# Patient Record
Sex: FEMALE | Race: WHITE | URBAN - METROPOLITAN AREA
[De-identification: names, ages, dates, MRNs, and addresses within clinical notes are randomized per-mention and may not be internally consistent; named-entity substitution may affect disease eponyms.]

---

## 2017-03-29 ENCOUNTER — OUTPATIENT (OUTPATIENT)
Dept: OUTPATIENT SERVICES | Facility: HOSPITAL | Age: 57
LOS: 1 days | Discharge: HOME | End: 2017-03-29

## 2017-06-27 DIAGNOSIS — Z12.31 ENCOUNTER FOR SCREENING MAMMOGRAM FOR MALIGNANT NEOPLASM OF BREAST: ICD-10-CM

## 2019-11-02 ENCOUNTER — OUTPATIENT (OUTPATIENT)
Dept: OUTPATIENT SERVICES | Facility: HOSPITAL | Age: 59
LOS: 1 days | Discharge: HOME | End: 2019-11-02
Payer: COMMERCIAL

## 2019-11-02 DIAGNOSIS — Z12.31 ENCOUNTER FOR SCREENING MAMMOGRAM FOR MALIGNANT NEOPLASM OF BREAST: ICD-10-CM

## 2019-11-02 DIAGNOSIS — Z85.3 PERSONAL HISTORY OF MALIGNANT NEOPLASM OF BREAST: ICD-10-CM

## 2019-11-02 PROCEDURE — 77063 BREAST TOMOSYNTHESIS BI: CPT | Mod: 26

## 2019-11-02 PROCEDURE — 77067 SCR MAMMO BI INCL CAD: CPT | Mod: 26

## 2019-11-18 PROBLEM — Z00.00 ENCOUNTER FOR PREVENTIVE HEALTH EXAMINATION: Status: ACTIVE | Noted: 2019-11-18

## 2019-11-25 ENCOUNTER — INPATIENT (INPATIENT)
Facility: HOSPITAL | Age: 59
LOS: 1 days | Discharge: HOME | DRG: 330 | End: 2019-11-27
Attending: SURGERY | Admitting: SURGERY
Payer: COMMERCIAL

## 2019-11-25 ENCOUNTER — RESULT REVIEW (OUTPATIENT)
Age: 59
End: 2019-11-25

## 2019-11-25 ENCOUNTER — APPOINTMENT (OUTPATIENT)
Dept: UROLOGY | Facility: HOSPITAL | Age: 59
End: 2019-11-25

## 2019-11-25 VITALS
HEART RATE: 91 BPM | OXYGEN SATURATION: 96 % | WEIGHT: 159.61 LBS | RESPIRATION RATE: 16 BRPM | HEIGHT: 67 IN | SYSTOLIC BLOOD PRESSURE: 142 MMHG | DIASTOLIC BLOOD PRESSURE: 77 MMHG | TEMPERATURE: 97 F

## 2019-11-25 DIAGNOSIS — Z90.710 ACQUIRED ABSENCE OF BOTH CERVIX AND UTERUS: Chronic | ICD-10-CM

## 2019-11-25 DIAGNOSIS — K57.90 DIVERTICULOSIS OF INTESTINE, PART UNSPECIFIED, WITHOUT PERFORATION OR ABSCESS WITHOUT BLEEDING: ICD-10-CM

## 2019-11-25 DIAGNOSIS — Z90.89 ACQUIRED ABSENCE OF OTHER ORGANS: Chronic | ICD-10-CM

## 2019-11-25 LAB — GLUCOSE BLDC GLUCOMTR-MCNC: 104 MG/DL — HIGH (ref 70–99)

## 2019-11-25 PROCEDURE — 88304 TISSUE EXAM BY PATHOLOGIST: CPT | Mod: 26

## 2019-11-25 PROCEDURE — 49320 DIAG LAPARO SEPARATE PROC: CPT

## 2019-11-25 PROCEDURE — 88307 TISSUE EXAM BY PATHOLOGIST: CPT | Mod: 26

## 2019-11-25 RX ORDER — BUPIVACAINE 13.3 MG/ML
20 INJECTION, SUSPENSION, LIPOSOMAL INFILTRATION ONCE
Refills: 0 | Status: DISCONTINUED | OUTPATIENT
Start: 2019-11-25 | End: 2019-11-27

## 2019-11-25 RX ORDER — HYDROMORPHONE HYDROCHLORIDE 2 MG/ML
0.5 INJECTION INTRAMUSCULAR; INTRAVENOUS; SUBCUTANEOUS EVERY 4 HOURS
Refills: 0 | Status: DISCONTINUED | OUTPATIENT
Start: 2019-11-25 | End: 2019-11-27

## 2019-11-25 RX ORDER — ALVIMOPAN 12 MG/1
12 CAPSULE ORAL ONCE
Refills: 0 | Status: COMPLETED | OUTPATIENT
Start: 2019-11-25 | End: 2019-11-25

## 2019-11-25 RX ORDER — HEPARIN SODIUM 5000 [USP'U]/ML
5000 INJECTION INTRAVENOUS; SUBCUTANEOUS EVERY 8 HOURS
Refills: 0 | Status: DISCONTINUED | OUTPATIENT
Start: 2019-11-25 | End: 2019-11-27

## 2019-11-25 RX ORDER — HEPARIN SODIUM 5000 [USP'U]/ML
5000 INJECTION INTRAVENOUS; SUBCUTANEOUS ONCE
Refills: 0 | Status: COMPLETED | OUTPATIENT
Start: 2019-11-25 | End: 2019-11-25

## 2019-11-25 RX ORDER — ONDANSETRON 8 MG/1
4 TABLET, FILM COATED ORAL EVERY 6 HOURS
Refills: 0 | Status: DISCONTINUED | OUTPATIENT
Start: 2019-11-25 | End: 2019-11-27

## 2019-11-25 RX ORDER — SODIUM CHLORIDE 9 MG/ML
1000 INJECTION, SOLUTION INTRAVENOUS
Refills: 0 | Status: DISCONTINUED | OUTPATIENT
Start: 2019-11-25 | End: 2019-11-27

## 2019-11-25 RX ORDER — ACETAMINOPHEN 500 MG
1000 TABLET ORAL ONCE
Refills: 0 | Status: COMPLETED | OUTPATIENT
Start: 2019-11-25 | End: 2019-11-25

## 2019-11-25 RX ORDER — GABAPENTIN 400 MG/1
300 CAPSULE ORAL ONCE
Refills: 0 | Status: COMPLETED | OUTPATIENT
Start: 2019-11-25 | End: 2019-11-25

## 2019-11-25 RX ORDER — KETOROLAC TROMETHAMINE 30 MG/ML
15 SYRINGE (ML) INJECTION EVERY 6 HOURS
Refills: 0 | Status: DISCONTINUED | OUTPATIENT
Start: 2019-11-25 | End: 2019-11-27

## 2019-11-25 RX ORDER — ACETAMINOPHEN 500 MG
1000 TABLET ORAL EVERY 8 HOURS
Refills: 0 | Status: DISCONTINUED | OUTPATIENT
Start: 2019-11-25 | End: 2019-11-27

## 2019-11-25 RX ADMIN — SODIUM CHLORIDE 75 MILLILITER(S): 9 INJECTION, SOLUTION INTRAVENOUS at 21:34

## 2019-11-25 RX ADMIN — Medication 1000 MILLIGRAM(S): at 23:41

## 2019-11-25 RX ADMIN — HEPARIN SODIUM 5000 UNIT(S): 5000 INJECTION INTRAVENOUS; SUBCUTANEOUS at 19:42

## 2019-11-25 RX ADMIN — HYDROMORPHONE HYDROCHLORIDE 0.5 MILLIGRAM(S): 2 INJECTION INTRAMUSCULAR; INTRAVENOUS; SUBCUTANEOUS at 13:45

## 2019-11-25 RX ADMIN — ALVIMOPAN 12 MILLIGRAM(S): 12 CAPSULE ORAL at 07:26

## 2019-11-25 RX ADMIN — Medication 1000 MILLIGRAM(S): at 23:01

## 2019-11-25 RX ADMIN — HEPARIN SODIUM 5000 UNIT(S): 5000 INJECTION INTRAVENOUS; SUBCUTANEOUS at 07:27

## 2019-11-25 RX ADMIN — Medication 1000 MILLIGRAM(S): at 07:26

## 2019-11-25 RX ADMIN — SODIUM CHLORIDE 75 MILLILITER(S): 9 INJECTION, SOLUTION INTRAVENOUS at 13:03

## 2019-11-25 RX ADMIN — Medication 15 MILLIGRAM(S): at 19:42

## 2019-11-25 RX ADMIN — Medication 1000 MILLIGRAM(S): at 16:59

## 2019-11-25 RX ADMIN — Medication 1000 MILLIGRAM(S): at 00:00

## 2019-11-25 RX ADMIN — ONDANSETRON 4 MILLIGRAM(S): 8 TABLET, FILM COATED ORAL at 18:01

## 2019-11-25 RX ADMIN — HYDROMORPHONE HYDROCHLORIDE 0.5 MILLIGRAM(S): 2 INJECTION INTRAMUSCULAR; INTRAVENOUS; SUBCUTANEOUS at 13:28

## 2019-11-25 RX ADMIN — GABAPENTIN 300 MILLIGRAM(S): 400 CAPSULE ORAL at 07:27

## 2019-11-25 NOTE — PROGRESS NOTE ADULT - SUBJECTIVE AND OBJECTIVE BOX
POST-OPERATIVE NOTE    Procedure: Laparoscopic Hand Assisted Sigmoidectomy    Diagnosis/Indication: Diverticulitis    Surgeon: Dr. Najera    S: Pt endorses mild abdominal pain around incision site but is well controlled with pain meds. Has had minimal PO intake, some nausea but no emesis. No flatus or BM. Denies CP, SOB, calf tenderness.     O:  T(C): 36.9 (11-25-19 @ 14:15), Max: 36.9 (11-25-19 @ 13:30)  T(F): 98.5 (11-25-19 @ 14:15), Max: 98.5 (11-25-19 @ 13:30)  HR: 68 (11-25-19 @ 14:15) (68 - 84)  BP: 118/71 (11-25-19 @ 14:15) (113/80 - 127/78)  RR: 18 (11-25-19 @ 14:15) (12 - 19)  SpO2: 99% (11-25-19 @ 14:15) (97% - 99%)  Wt(kg): --          Gen: NAD, resting comfortably in bed  C/V: NSR  Pulm: Nonlabored breathing, no respiratory distress  Abd: soft, ATTP/ND, incisions C/D/I  Extrem: WWP, no calf edema or tenderness, SCDs in place

## 2019-11-25 NOTE — H&P ADULT - NSICDXPASTSURGICALHX_GEN_ALL_CORE_FT
PAST SURGICAL HISTORY:  History of total hysterectomy     S/P T&A (status post tonsillectomy and adenoidectomy)

## 2019-11-25 NOTE — BRIEF OPERATIVE NOTE - OPERATION/FINDINGS
Preop Dx: Colovesicular Fistula  Postop Dx: Same as above  Procedure: Laparoscopic Hand-Assisted Sigmoidectomy, flexible sigmoidoscopy  Findings: Abdomen accessed via open cutdown. Sigmoid colon noted to be adherent to bladder with area of woody inflammation. Decision was made to place hand-port via prior Pfannenstiel incision. The sigmoid colon was sharply dissected from the bladder. A small abscess cavity was noted and a small amount of pus evacuated. Methylene blue was placed into mtz introperatively by Urolgoy and no obvious extravasation or leak noted from bladder. Lateral to medial dissection proceeded with assistance of hand-port. Distal margin taken at healthy tissue distal to the area of inflammation with Contour Green load stapler x1. Proximal sigmoid taken at healthy tissue and anvil placed. End to End primary anastomosis with EEA#29 stapler performed. Anastomotic donuts intact. Air leak test negative for leak. Flexible sigmoidoscopy performed revealed intact, patent anastomosis without bleeding. Pelvis irrigated. Pfannenstiel fascia closed with 0-Vicryl plus running suture. Incisions irrigated and closed. Preop Dx: Colovesicular Fistula  Postop Dx: Same as above  Procedure: Laparoscopic Hand-Assisted Sigmoidectomy, flexible sigmoidoscopy  Findings: Abdomen accessed via open cutdown. Sigmoid colon noted to be adherent to bladder with area of woody inflammation. Decision was made to place hand-port via prior Pfannenstiel incision. The sigmoid colon was sharply dissected from the bladder. A small abscess cavity was noted and a small amount of pus evacuated. Methylene blue was placed into mtz introperatively by Urolgoy and no obvious extravasation or leak noted from bladder. Lateral to medial dissection proceeded with assistance of hand-port. Distal margin taken at healthy tissue distal to the area of inflammation with Contour Green load stapler x1. Proximal sigmoid taken at healthy tissue and anvil placed. End to End primary anastomosis with EEA#29 stapler performed. Anastomotic donuts intact. Air leak test negative for leak. Flexible sigmoidoscopy performed revealed intact, patent anastomosis without bleeding. Pelvis irrigated. Pfannenstiel fascia closed with 0-Vicryl plus running suture. Incisions irrigated and closed. Colorectal bundle and ERAS protocols followed.

## 2019-11-25 NOTE — PACU DISCHARGE NOTE - COMMENTS
pt met criteria for discharge- alert and oriented x4- follow commands and moving all extremities- Pt experiences relief s/p medication administration- abdomen soft and non tender with 4 surgical lap sites with gauze and tegaderm- mtz patent and draining- VSS- pt hemodynamically stable at present- report given to Ngozi- Pt left unit via bed to 9617-1 on supplemental oxygen

## 2019-11-25 NOTE — H&P ADULT - NSICDXPASTMEDICALHX_GEN_ALL_CORE_FT
PAST MEDICAL HISTORY:  Adenomyosis     Diverticulosis     Endometriosis     HLD (hyperlipidemia)     HTN (hypertension)

## 2019-11-25 NOTE — H&P ADULT - PROBLEM SELECTOR PLAN 1
-ERAS protocol  -CLD/IVF  -Pain/Nausea control  -Will await return of bowel function prior to advancing diet

## 2019-11-25 NOTE — PROGRESS NOTE ADULT - ASSESSMENT
59F s/p Laparoscopic Hand Assisted Sigmoidectomy    ERAS protocol  CLD  LR @ 75  Pain/nausea control  Await ROBF  SQH  IS/SCD/OOBA

## 2019-11-25 NOTE — H&P ADULT - HISTORY OF PRESENT ILLNESS
59F hx HTN, HL, presents as same day admission after laparoscopic hand-assisted sigmoidectomy. Pt initially presented with a hx of multiple UTI within the past year and vague lower abdominal pain. No pneumaturia or fecaluria. Last colonoscopy was in 2015 and noted diverticulosis. She underwent an outpatient workup with included imaging and Cystoscopy, however no obvious signs of colovesical fistula were noted. No change in her bowel habits. No recent weight loss. No prior episodes of diverticulitis. Given her continued symptoms of recurrent UTIs and vague lower abdominal pain, the patient presented today for laparoscopy and sigmoidectomy if a colovesicular fistula noted intraoperatively.

## 2019-11-25 NOTE — H&P ADULT - ASSESSMENT
59F with colovesicular fistula now s/p laparoscopic hand-assisted sigmoidectomy with primary anastomosis

## 2019-11-26 LAB
ANION GAP SERPL CALC-SCNC: 8 MMOL/L — SIGNIFICANT CHANGE UP (ref 5–17)
BUN SERPL-MCNC: 10 MG/DL — SIGNIFICANT CHANGE UP (ref 7–23)
CALCIUM SERPL-MCNC: 9.3 MG/DL — SIGNIFICANT CHANGE UP (ref 8.4–10.5)
CHLORIDE SERPL-SCNC: 105 MMOL/L — SIGNIFICANT CHANGE UP (ref 96–108)
CO2 SERPL-SCNC: 25 MMOL/L — SIGNIFICANT CHANGE UP (ref 22–31)
CREAT SERPL-MCNC: 0.81 MG/DL — SIGNIFICANT CHANGE UP (ref 0.5–1.3)
GLUCOSE SERPL-MCNC: 100 MG/DL — HIGH (ref 70–99)
HCT VFR BLD CALC: 35.4 % — SIGNIFICANT CHANGE UP (ref 34.5–45)
HCV AB S/CO SERPL IA: 0.12 S/CO — SIGNIFICANT CHANGE UP
HCV AB SERPL-IMP: SIGNIFICANT CHANGE UP
HGB BLD-MCNC: 11.5 G/DL — SIGNIFICANT CHANGE UP (ref 11.5–15.5)
MAGNESIUM SERPL-MCNC: 2.1 MG/DL — SIGNIFICANT CHANGE UP (ref 1.6–2.6)
MCHC RBC-ENTMCNC: 30.5 PG — SIGNIFICANT CHANGE UP (ref 27–34)
MCHC RBC-ENTMCNC: 32.5 GM/DL — SIGNIFICANT CHANGE UP (ref 32–36)
MCV RBC AUTO: 93.9 FL — SIGNIFICANT CHANGE UP (ref 80–100)
NRBC # BLD: 0 /100 WBCS — SIGNIFICANT CHANGE UP (ref 0–0)
PHOSPHATE SERPL-MCNC: 3.3 MG/DL — SIGNIFICANT CHANGE UP (ref 2.5–4.5)
PLATELET # BLD AUTO: 261 K/UL — SIGNIFICANT CHANGE UP (ref 150–400)
POTASSIUM SERPL-MCNC: 4.4 MMOL/L — SIGNIFICANT CHANGE UP (ref 3.5–5.3)
POTASSIUM SERPL-SCNC: 4.4 MMOL/L — SIGNIFICANT CHANGE UP (ref 3.5–5.3)
RBC # BLD: 3.77 M/UL — LOW (ref 3.8–5.2)
RBC # FLD: 11.9 % — SIGNIFICANT CHANGE UP (ref 10.3–14.5)
SODIUM SERPL-SCNC: 138 MMOL/L — SIGNIFICANT CHANGE UP (ref 135–145)
WBC # BLD: 7.79 K/UL — SIGNIFICANT CHANGE UP (ref 3.8–10.5)
WBC # FLD AUTO: 7.79 K/UL — SIGNIFICANT CHANGE UP (ref 3.8–10.5)

## 2019-11-26 RX ADMIN — Medication 1000 MILLIGRAM(S): at 07:20

## 2019-11-26 RX ADMIN — HEPARIN SODIUM 5000 UNIT(S): 5000 INJECTION INTRAVENOUS; SUBCUTANEOUS at 04:50

## 2019-11-26 RX ADMIN — Medication 15 MILLIGRAM(S): at 18:15

## 2019-11-26 RX ADMIN — Medication 15 MILLIGRAM(S): at 18:03

## 2019-11-26 RX ADMIN — Medication 1000 MILLIGRAM(S): at 17:45

## 2019-11-26 RX ADMIN — Medication 15 MILLIGRAM(S): at 05:13

## 2019-11-26 RX ADMIN — Medication 15 MILLIGRAM(S): at 12:05

## 2019-11-26 RX ADMIN — Medication 15 MILLIGRAM(S): at 23:13

## 2019-11-26 RX ADMIN — Medication 1000 MILLIGRAM(S): at 07:45

## 2019-11-26 RX ADMIN — Medication 15 MILLIGRAM(S): at 00:40

## 2019-11-26 RX ADMIN — Medication 1000 MILLIGRAM(S): at 16:50

## 2019-11-26 RX ADMIN — Medication 15 MILLIGRAM(S): at 01:00

## 2019-11-26 RX ADMIN — Medication 15 MILLIGRAM(S): at 05:04

## 2019-11-26 RX ADMIN — Medication 15 MILLIGRAM(S): at 11:49

## 2019-11-26 RX ADMIN — Medication 1000 MILLIGRAM(S): at 23:12

## 2019-11-26 RX ADMIN — HEPARIN SODIUM 5000 UNIT(S): 5000 INJECTION INTRAVENOUS; SUBCUTANEOUS at 11:49

## 2019-11-26 NOTE — PROGRESS NOTE ADULT - SUBJECTIVE AND OBJECTIVE BOX
Looks well. Tolerating clears  AFVSS  Abd soft, ND, dressing dry, some oozing at umbilicus.  u/o good    A/P: POD 1 lap sigmoid resection for colovesical fistula.  1. continue clear liquids  2. Advance to LRD once passing flatus  3. OOB, IS  4. Keep mtz for 7-10 days for fistula.  5. Decrease/HLIV

## 2019-11-26 NOTE — PROGRESS NOTE ADULT - SUBJECTIVE AND OBJECTIVE BOX
INTERVAL HPI: Patient seen and examined at bedside by chief resident. No acute events overnight. Tolerating her CLD w/o any N/V. Feels rumbling in her stomach but has not passed flatus; no BM. Has been OOBA mtz in place with good UOP    heparin  Injectable 5000        Vital Signs Last 24 Hrs  T(C): 36.7 (26 Nov 2019 06:04), Max: 36.9 (25 Nov 2019 13:30)  T(F): 98 (26 Nov 2019 06:04), Max: 98.5 (25 Nov 2019 13:30)  HR: 60 (26 Nov 2019 06:04) (60 - 84)  BP: 112/70 (26 Nov 2019 06:04) (104/69 - 127/78)  BP(mean): 90 (25 Nov 2019 14:15) (86 - 96)  RR: 16 (26 Nov 2019 06:04) (12 - 19)  SpO2: 95% (26 Nov 2019 06:04) (95% - 99%)  I&O's Summary    25 Nov 2019 07:01  -  26 Nov 2019 07:00  --------------------------------------------------------  IN: 1400 mL / OUT: 2400 mL / NET: -1000 mL        Physical Exam:  General: NAD  Pulmonary: Nonlabored breathing, no respiratory distress  Cardiovascular: RRR  Abdominal: Soft, nontender, nondistended, incision c/d/i  Extremities: WWP, no edema        LABS:                        11.5   7.79  )-----------( 261      ( 26 Nov 2019 05:44 )             35.4     11-26    138  |  105  |  10  ----------------------------<  100<H>  4.4   |  25  |  0.81    Ca    9.3      26 Nov 2019 05:44  Phos  3.3     11-26  Mg     2.1     11-26            Assessment and Plan:

## 2019-11-26 NOTE — PROGRESS NOTE ADULT - ASSESSMENT
59F s/p Laparoscopic Hand Assisted Sigmoidectomy POD1    ERAS protocol  CLD  Monitor for ROBF, adv to LRD once passing flatus  IVHL  Marroquin to stay for 7-10 days  Pain/nausea control  SQH  IS/SCD/OOBA

## 2019-11-27 ENCOUNTER — TRANSCRIPTION ENCOUNTER (OUTPATIENT)
Age: 59
End: 2019-11-27

## 2019-11-27 VITALS
DIASTOLIC BLOOD PRESSURE: 80 MMHG | HEART RATE: 78 BPM | TEMPERATURE: 99 F | OXYGEN SATURATION: 95 % | RESPIRATION RATE: 16 BRPM | SYSTOLIC BLOOD PRESSURE: 134 MMHG

## 2019-11-27 LAB
ANION GAP SERPL CALC-SCNC: 8 MMOL/L — SIGNIFICANT CHANGE UP (ref 5–17)
BUN SERPL-MCNC: 6 MG/DL — LOW (ref 7–23)
CALCIUM SERPL-MCNC: 8.8 MG/DL — SIGNIFICANT CHANGE UP (ref 8.4–10.5)
CHLORIDE SERPL-SCNC: 108 MMOL/L — SIGNIFICANT CHANGE UP (ref 96–108)
CO2 SERPL-SCNC: 26 MMOL/L — SIGNIFICANT CHANGE UP (ref 22–31)
CREAT SERPL-MCNC: 0.71 MG/DL — SIGNIFICANT CHANGE UP (ref 0.5–1.3)
GLUCOSE SERPL-MCNC: 94 MG/DL — SIGNIFICANT CHANGE UP (ref 70–99)
HCT VFR BLD CALC: 30.3 % — LOW (ref 34.5–45)
HGB BLD-MCNC: 10.1 G/DL — LOW (ref 11.5–15.5)
MAGNESIUM SERPL-MCNC: 2.1 MG/DL — SIGNIFICANT CHANGE UP (ref 1.6–2.6)
MCHC RBC-ENTMCNC: 31.4 PG — SIGNIFICANT CHANGE UP (ref 27–34)
MCHC RBC-ENTMCNC: 33.3 GM/DL — SIGNIFICANT CHANGE UP (ref 32–36)
MCV RBC AUTO: 94.1 FL — SIGNIFICANT CHANGE UP (ref 80–100)
NRBC # BLD: 0 /100 WBCS — SIGNIFICANT CHANGE UP (ref 0–0)
PHOSPHATE SERPL-MCNC: 1.9 MG/DL — LOW (ref 2.5–4.5)
PLATELET # BLD AUTO: 196 K/UL — SIGNIFICANT CHANGE UP (ref 150–400)
POTASSIUM SERPL-MCNC: 4.1 MMOL/L — SIGNIFICANT CHANGE UP (ref 3.5–5.3)
POTASSIUM SERPL-SCNC: 4.1 MMOL/L — SIGNIFICANT CHANGE UP (ref 3.5–5.3)
RBC # BLD: 3.22 M/UL — LOW (ref 3.8–5.2)
RBC # FLD: 12.1 % — SIGNIFICANT CHANGE UP (ref 10.3–14.5)
SODIUM SERPL-SCNC: 142 MMOL/L — SIGNIFICANT CHANGE UP (ref 135–145)
WBC # BLD: 5.82 K/UL — SIGNIFICANT CHANGE UP (ref 3.8–10.5)
WBC # FLD AUTO: 5.82 K/UL — SIGNIFICANT CHANGE UP (ref 3.8–10.5)

## 2019-11-27 PROCEDURE — 49320 DIAG LAPARO SEPARATE PROC: CPT

## 2019-11-27 RX ORDER — DOCUSATE SODIUM 100 MG
1 CAPSULE ORAL
Qty: 14 | Refills: 0
Start: 2019-11-27 | End: 2019-12-03

## 2019-11-27 RX ADMIN — Medication 1000 MILLIGRAM(S): at 00:15

## 2019-11-27 RX ADMIN — Medication 1000 MILLIGRAM(S): at 16:21

## 2019-11-27 RX ADMIN — Medication 15 MILLIGRAM(S): at 11:24

## 2019-11-27 RX ADMIN — HEPARIN SODIUM 5000 UNIT(S): 5000 INJECTION INTRAVENOUS; SUBCUTANEOUS at 11:24

## 2019-11-27 RX ADMIN — Medication 15 MILLIGRAM(S): at 05:04

## 2019-11-27 RX ADMIN — Medication 15 MILLIGRAM(S): at 06:30

## 2019-11-27 RX ADMIN — Medication 1000 MILLIGRAM(S): at 07:02

## 2019-11-27 RX ADMIN — Medication 64.25 MILLIMOLE(S): at 11:24

## 2019-11-27 RX ADMIN — Medication 15 MILLIGRAM(S): at 17:00

## 2019-11-27 RX ADMIN — Medication 15 MILLIGRAM(S): at 00:10

## 2019-11-27 RX ADMIN — Medication 1000 MILLIGRAM(S): at 15:21

## 2019-11-27 RX ADMIN — Medication 15 MILLIGRAM(S): at 16:37

## 2019-11-27 RX ADMIN — Medication 15 MILLIGRAM(S): at 11:40

## 2019-11-27 NOTE — DISCHARGE NOTE NURSING/CASE MANAGEMENT/SOCIAL WORK - PATIENT PORTAL LINK FT
You can access the FollowMyHealth Patient Portal offered by [facility] by registering at the following website: [email or web address]. By joining [employer]’s FollowMyHealth portal, you will also be able to view your health information using other applications (apps) compatible with our system.

## 2019-11-27 NOTE — DISCHARGE NOTE PROVIDER - INSTRUCTIONS
Foods that are generally allowed on a low-fiber diet include:  White bread without nuts and seeds  White rice, plain white pasta, and crackers  Refined hot cereals, such as Cream of Wheat, or cold cereals with less than 1 gram of fiber per serving  Pancakes or waffles made from white refined flour  Most canned or well-cooked vegetables and fruits without skins or seeds  Fruit and vegetable juice with little or no pulp, fruit-flavored drinks, and flavored alcala  Tender meat, poultry, fish, eggs and tofu  Milk and foods made from milk — such as yogurt, pudding, ice cream, cheeses and sour cream — if tolerated  Butter, margarine, oils and salad dressings without seeds

## 2019-11-27 NOTE — DISCHARGE NOTE PROVIDER - HOSPITAL COURSE
59F with colovesicular fistula presented to [facility] for elective procedure now s/p laparoscopic hand-assisted sigmoidectomy with primary anastomosis 59F with colovesicular fistula presented to [facility] for elective procedure. Pt was taken to the OR on 11/25/19 for laparoscopic hand-assisted sigmoidectomy with primary anastomosis         *INCOMPLETE* 59F with colovesicular fistula presented to [facility] for elective procedure. Pt was taken to the OR on 11/25/19 for laparoscopic hand-assisted sigmoidectomy with primary anastomosis. No perioperative complications noted. Diet was advanced as tolerated and after pt having adequate bowel function. Pt continued to be monitored closely and is recovering appropriately after surgery. At time of discharge pt is tolerating diet, pain is well controlled, pt is voiding/ambulating freely. Pt is hemodynamically stable and medically ready for discharge. Pt is hemodynamically stable and medically ready for discharge. Marroquin catheter will stay in place until follow appt.

## 2019-11-27 NOTE — DISCHARGE NOTE PROVIDER - NSDCMRMEDTOKEN_GEN_ALL_CORE_FT
Crestor 20 mg oral tablet: 1 tab(s) orally once a day  losartan 50 mg oral tablet: 1 tab(s) orally once a day Colace 100 mg oral capsule: 1 cap(s) orally 2 times a day for constipation caused by Percocet.   Crestor 20 mg oral tablet: 1 tab(s) orally once a day  losartan 50 mg oral tablet: 1 tab(s) orally once a day  Percocet 5/325 oral tablet: 1 tab(s) orally every 6 hours, As Needed -for severe pain MDD:4

## 2019-11-27 NOTE — DISCHARGE NOTE PROVIDER - NSDCCPCAREPLAN_GEN_ALL_CORE_FT
PRINCIPAL DISCHARGE DIAGNOSIS  Diagnosis: Diverticulosis  Assessment and Plan of Treatment: Diverticulosis

## 2019-11-27 NOTE — PROGRESS NOTE ADULT - ASSESSMENT
60 yo female HTN, HL, with multiple episodes of UTI and concern for colovesicular fistula s/p Laparoscopic hand assisted sigmoidectomy with primary anastomosis 11/25    Pain/nausea control  CLD/ IVF LR @ 75cc/hr  Marroquin DO NOT remove pt will go home with it  SQH for DVT ppx  OOBA, IS, SCDs  AM labs

## 2019-11-27 NOTE — DISCHARGE NOTE PROVIDER - CARE PROVIDER_API CALL
Horacio Najera)  ColonRectal Surgery; Surgery  [address] [place] [address], [address]  [place] [address]  Phone: [phone]  Fax: [phone]  Follow Up Time:

## 2019-11-27 NOTE — PROGRESS NOTE ADULT - SUBJECTIVE AND OBJECTIVE BOX
INTERVAL HPI/OVERNIGHT EVENTS: NAEO. AFVSS.     STATUS POST:  Laparoscopic Hand Assisted Sigmoidectomy with primary anastomosis 11/25    POST OPERATIVE DAY #: 2    SUBJECTIVE: Pt seen and examined at bedside this am by surgery team. Pain well controlled, tolerating diet. Reports passing flatus and had BM this am. Denies f/n/v/cp/sob.    MEDICATIONS  (STANDING):  acetaminophen   Tablet .. 1000 milliGRAM(s) Oral every 8 hours  BUpivacaine liposome 1.3% Injectable (no eMAR) 20 milliLiter(s) Local Injection once  heparin  Injectable 5000 Unit(s) SubCutaneous every 8 hours  ketorolac   Injectable 15 milliGRAM(s) IV Push every 6 hours  lactated ringers. 1000 milliLiter(s) (75 mL/Hr) IV Continuous <Continuous>    MEDICATIONS  (PRN):  HYDROmorphone  Injectable 0.5 milliGRAM(s) IV Push every 4 hours PRN Breakthrough Pain  ondansetron Injectable 4 milliGRAM(s) IV Push every 6 hours PRN Nausea      Vital Signs Last 24 Hrs  T(C): 36.4 (27 Nov 2019 05:48), Max: 37.1 (26 Nov 2019 16:20)  T(F): 97.6 (27 Nov 2019 05:48), Max: 98.7 (26 Nov 2019 16:20)  HR: 72 (27 Nov 2019 05:48) (69 - 80)  BP: 135/89 (27 Nov 2019 05:48) (105/68 - 135/89)  BP(mean): --  RR: 16 (27 Nov 2019 05:48) (16 - 17)  SpO2: 97% (27 Nov 2019 05:48) (97% - 98%)    PHYSICAL EXAM:    Constitutional: A&Ox3, NAD    Respiratory: non labored breathing, no respiratory distress    Cardiovascular: NSR, RRR    Gastrointestinal: Soft, nontender, nondistended, incision c/d/i    Genitourinary: Marroquin in place draining clear yellow urine     Extremities: wwp, no calf tenderness or edema. SCDs in place.      I&O's Detail    26 Nov 2019 07:01  -  27 Nov 2019 07:00  --------------------------------------------------------  IN:    lactated ringers.: 825 mL    Oral Fluid: 800 mL  Total IN: 1625 mL    OUT:    Indwelling Catheter - Urethral: 3650 mL  Total OUT: 3650 mL    Total NET: -2025 mL          LABS:                        11.5   7.79  )-----------( 261      ( 26 Nov 2019 05:44 )             35.4     11-26    138  |  105  |  10  ----------------------------<  100<H>  4.4   |  25  |  0.81    Ca    9.3      26 Nov 2019 05:44  Phos  3.3     11-26  Mg     2.1     11-26            RADIOLOGY & ADDITIONAL STUDIES:

## 2019-11-27 NOTE — PROGRESS NOTE ADULT - ATTENDING COMMENTS
Tolerating clears. Passing flatus and BM  AFVSS  Abd soft, ND, NT  Incisions clean    A/P: POD 2 lap sigmoid resection for colovesical fistula. Doing well.  1. Low residue diet  2. OOB, IS  3. Keep mtz for bladder fistula  4. likely d/c home tomorrow, f/u with me 12/3 for mtz removal.    Dr. Ortiz covering until 12/2

## 2019-12-03 DIAGNOSIS — N32.1 VESICOINTESTINAL FISTULA: ICD-10-CM

## 2019-12-03 DIAGNOSIS — K63.0 ABSCESS OF INTESTINE: ICD-10-CM

## 2019-12-03 DIAGNOSIS — Z90.710 ACQUIRED ABSENCE OF BOTH CERVIX AND UTERUS: ICD-10-CM

## 2019-12-03 DIAGNOSIS — Z91.013 ALLERGY TO SEAFOOD: ICD-10-CM

## 2019-12-03 DIAGNOSIS — E78.5 HYPERLIPIDEMIA, UNSPECIFIED: ICD-10-CM

## 2019-12-03 DIAGNOSIS — K57.30 DIVERTICULOSIS OF LARGE INTESTINE WITHOUT PERFORATION OR ABSCESS WITHOUT BLEEDING: ICD-10-CM

## 2019-12-03 DIAGNOSIS — Z87.440 PERSONAL HISTORY OF URINARY (TRACT) INFECTIONS: ICD-10-CM

## 2019-12-03 DIAGNOSIS — I10 ESSENTIAL (PRIMARY) HYPERTENSION: ICD-10-CM

## 2019-12-03 LAB — SURGICAL PATHOLOGY STUDY: SIGNIFICANT CHANGE UP

## 2019-12-04 ENCOUNTER — RECORD ABSTRACTING (OUTPATIENT)
Age: 59
End: 2019-12-04

## 2019-12-04 DIAGNOSIS — Z78.9 OTHER SPECIFIED HEALTH STATUS: ICD-10-CM

## 2019-12-04 DIAGNOSIS — F17.200 NICOTINE DEPENDENCE, UNSPECIFIED, UNCOMPLICATED: ICD-10-CM

## 2019-12-04 DIAGNOSIS — Z80.52 FAMILY HISTORY OF MALIGNANT NEOPLASM OF BLADDER: ICD-10-CM

## 2019-12-09 PROCEDURE — 80048 BASIC METABOLIC PNL TOTAL CA: CPT

## 2019-12-09 PROCEDURE — 88304 TISSUE EXAM BY PATHOLOGIST: CPT

## 2019-12-09 PROCEDURE — 86850 RBC ANTIBODY SCREEN: CPT

## 2019-12-09 PROCEDURE — 86900 BLOOD TYPING SEROLOGIC ABO: CPT

## 2019-12-09 PROCEDURE — 84100 ASSAY OF PHOSPHORUS: CPT

## 2019-12-09 PROCEDURE — 82962 GLUCOSE BLOOD TEST: CPT

## 2019-12-09 PROCEDURE — 36415 COLL VENOUS BLD VENIPUNCTURE: CPT

## 2019-12-09 PROCEDURE — 88307 TISSUE EXAM BY PATHOLOGIST: CPT

## 2019-12-09 PROCEDURE — C1889: CPT

## 2019-12-09 PROCEDURE — 85027 COMPLETE CBC AUTOMATED: CPT

## 2019-12-09 PROCEDURE — 86803 HEPATITIS C AB TEST: CPT

## 2019-12-09 PROCEDURE — 86901 BLOOD TYPING SEROLOGIC RH(D): CPT

## 2019-12-09 PROCEDURE — 83735 ASSAY OF MAGNESIUM: CPT

## 2019-12-11 ENCOUNTER — APPOINTMENT (OUTPATIENT)
Dept: UROLOGY | Facility: CLINIC | Age: 59
End: 2019-12-11
Payer: COMMERCIAL

## 2019-12-11 VITALS
TEMPERATURE: 97.7 F | DIASTOLIC BLOOD PRESSURE: 90 MMHG | BODY MASS INDEX: 24.33 KG/M2 | HEIGHT: 67 IN | SYSTOLIC BLOOD PRESSURE: 133 MMHG | WEIGHT: 155 LBS

## 2019-12-11 DIAGNOSIS — Z86.39 PERSONAL HISTORY OF OTHER ENDOCRINE, NUTRITIONAL AND METABOLIC DISEASE: ICD-10-CM

## 2019-12-11 DIAGNOSIS — Z80.3 FAMILY HISTORY OF MALIGNANT NEOPLASM OF BREAST: ICD-10-CM

## 2019-12-11 DIAGNOSIS — Z86.79 PERSONAL HISTORY OF OTHER DISEASES OF THE CIRCULATORY SYSTEM: ICD-10-CM

## 2019-12-11 DIAGNOSIS — Z87.440 PERSONAL HISTORY OF URINARY (TRACT) INFECTIONS: ICD-10-CM

## 2019-12-11 DIAGNOSIS — Z80.7 FAMILY HISTORY OF OTHER MALIGNANT NEOPLASMS OF LYMPHOID, HEMATOPOIETIC AND RELATED TISSUES: ICD-10-CM

## 2019-12-11 DIAGNOSIS — Z00.00 ENCOUNTER FOR GENERAL ADULT MEDICAL EXAMINATION W/OUT ABNORMAL FINDINGS: ICD-10-CM

## 2019-12-11 LAB
BILIRUB UR QL STRIP: NEGATIVE
CLARITY UR: CLEAR
COLLECTION METHOD: NORMAL
GLUCOSE UR-MCNC: NEGATIVE
HCG UR QL: 0.2 EU/DL
HGB UR QL STRIP.AUTO: NORMAL
KETONES UR-MCNC: NEGATIVE
LEUKOCYTE ESTERASE UR QL STRIP: NEGATIVE
NITRITE UR QL STRIP: NEGATIVE
PH UR STRIP: 5
PROT UR STRIP-MCNC: NEGATIVE
SP GR UR STRIP: 1.01

## 2019-12-11 PROCEDURE — 51798 US URINE CAPACITY MEASURE: CPT

## 2019-12-11 PROCEDURE — 81003 URINALYSIS AUTO W/O SCOPE: CPT | Mod: QW

## 2019-12-11 PROCEDURE — 99214 OFFICE O/P EST MOD 30 MIN: CPT | Mod: 25

## 2019-12-18 LAB — BACTERIA UR CULT: ABNORMAL

## 2019-12-18 NOTE — PHYSICAL EXAM
[General Appearance - Well Developed] : well developed [General Appearance - Well Nourished] : well nourished [Normal Appearance] : normal appearance [Well Groomed] : well groomed [General Appearance - In No Acute Distress] : no acute distress [Abdomen Soft] : soft [Abdomen Tenderness] : non-tender [Costovertebral Angle Tenderness] : no ~M costovertebral angle tenderness [Urinary Bladder Findings] : the bladder was normal on palpation [Edema] : no peripheral edema [] : no respiratory distress [Respiration, Rhythm And Depth] : normal respiratory rhythm and effort [Exaggerated Use Of Accessory Muscles For Inspiration] : no accessory muscle use [Affect] : the affect was normal [Oriented To Time, Place, And Person] : oriented to person, place, and time [Not Anxious] : not anxious [Mood] : the mood was normal [Normal Station and Gait] : the gait and station were normal for the patient's age [No Focal Deficits] : no focal deficits [No Palpable Adenopathy] : no palpable adenopathy

## 2019-12-18 NOTE — HISTORY OF PRESENT ILLNESS
[FreeTextEntry1] : Patient is a 60 yo female 10 days post-op sigmoid resection by Dr. Horacio Najera for a colovesical fistula.  300cc of Methylene blue inserted into the bladder intraoperatively- no leakage.  She presented yesterday for mtz removal.  She feels well and denies any symtoms of a UTI

## 2019-12-18 NOTE — ASSESSMENT
[FreeTextEntry1] : 60 yo female with colovesical fistula s/p sigmoid resection 10 days ago, doing well.  No symptoms of UTI and emptying bladder appropriately.  Will check UC and see back prn.

## 2020-01-29 DIAGNOSIS — R39.9 UNSPECIFIED SYMPTOMS AND SIGNS INVOLVING THE GENITOURINARY SYSTEM: ICD-10-CM

## 2020-01-30 LAB
APPEARANCE: CLEAR
BACTERIA: NEGATIVE
BILIRUBIN URINE: NEGATIVE
BLOOD URINE: NEGATIVE
COLOR: COLORLESS
GLUCOSE QUALITATIVE U: NEGATIVE
HYALINE CASTS: 0 /LPF
KETONES URINE: NEGATIVE
LEUKOCYTE ESTERASE URINE: ABNORMAL
MICROSCOPIC-UA: NORMAL
NITRITE URINE: NEGATIVE
PH URINE: 6.5
PROTEIN URINE: NEGATIVE
RED BLOOD CELLS URINE: 0 /HPF
SPECIFIC GRAVITY URINE: 1
SQUAMOUS EPITHELIAL CELLS: 0 /HPF
UROBILINOGEN URINE: NORMAL
WHITE BLOOD CELLS URINE: 3 /HPF

## 2020-01-31 LAB — BACTERIA UR CULT: ABNORMAL

## 2020-02-13 ENCOUNTER — APPOINTMENT (OUTPATIENT)
Dept: UROLOGY | Facility: CLINIC | Age: 60
End: 2020-02-13
Payer: COMMERCIAL

## 2020-02-13 LAB
BILIRUB UR QL STRIP: NORMAL
CLARITY UR: CLEAR
COLLECTION METHOD: NORMAL
GLUCOSE UR-MCNC: NORMAL
HCG UR QL: 0.2 EU/DL
HGB UR QL STRIP.AUTO: NORMAL
KETONES UR-MCNC: NORMAL
LEUKOCYTE ESTERASE UR QL STRIP: NORMAL
NITRITE UR QL STRIP: NORMAL
PH UR STRIP: 5.5
PROT UR STRIP-MCNC: NORMAL
SP GR UR STRIP: 1.02

## 2020-02-13 PROCEDURE — 99214 OFFICE O/P EST MOD 30 MIN: CPT | Mod: 25

## 2020-02-13 PROCEDURE — 51701 INSERT BLADDER CATHETER: CPT

## 2020-02-13 PROCEDURE — 81003 URINALYSIS AUTO W/O SCOPE: CPT | Mod: QW

## 2020-02-13 NOTE — HISTORY OF PRESENT ILLNESS
[FreeTextEntry1] : Patient is a 58 yo female status post-op sigmoid resection by Dr. Horacio Najera for a colovesical fistula.  She had a urine culture last week which was positive for Group B strep.  As I suspect this is a contaminant, she presents today for a straight cath specimen.   She is also experiecning some mild left lower quadrant as well as dysuria.    She underwent a repeat CT scan with rectal contrast 2 days ago- results are pending.

## 2020-02-13 NOTE — LETTER BODY
[Consult Letter:] : I had the pleasure of evaluating your patient, [unfilled]. [Dear  ___] : Dear  [unfilled], [Consult Closing:] : Thank you very much for allowing me to participate in the care of this patient.  If you have any questions, please do not hesitate to contact me. [Please see my note below.] : Please see my note below. [Sincerely,] : Sincerely, [FreeTextEntry1] : Dr. Yennifer Wang\par

## 2020-02-13 NOTE — ASSESSMENT
[FreeTextEntry1] : I sent today's specimen for repeat culture.  I have given her uribel and am empirically treating her with Bactrim for the time being.  I will await the CT report.

## 2020-02-18 LAB — BACTERIA UR CULT: NORMAL

## 2020-02-25 ENCOUNTER — APPOINTMENT (OUTPATIENT)
Dept: UROLOGY | Facility: CLINIC | Age: 60
End: 2020-02-25

## 2020-03-12 ENCOUNTER — APPOINTMENT (OUTPATIENT)
Dept: UROLOGY | Facility: CLINIC | Age: 60
End: 2020-03-12
Payer: COMMERCIAL

## 2020-03-12 ENCOUNTER — RESULT CHARGE (OUTPATIENT)
Age: 60
End: 2020-03-12

## 2020-03-12 VITALS — TEMPERATURE: 98.4 F | HEART RATE: 69 BPM | SYSTOLIC BLOOD PRESSURE: 172 MMHG | DIASTOLIC BLOOD PRESSURE: 74 MMHG

## 2020-03-12 VITALS — SYSTOLIC BLOOD PRESSURE: 144 MMHG | HEART RATE: 111 BPM | DIASTOLIC BLOOD PRESSURE: 94 MMHG | TEMPERATURE: 97.8 F

## 2020-03-12 LAB
BILIRUB UR QL STRIP: NORMAL
CLARITY UR: CLEAR
COLLECTION METHOD: NORMAL
GLUCOSE UR-MCNC: NORMAL
HCG UR QL: 0.2 EU/DL
HGB UR QL STRIP.AUTO: NORMAL
KETONES UR-MCNC: NORMAL
LEUKOCYTE ESTERASE UR QL STRIP: NORMAL
NITRITE UR QL STRIP: NORMAL
PH UR STRIP: 7
PROT UR STRIP-MCNC: NORMAL
SP GR UR STRIP: 1.02

## 2020-03-12 PROCEDURE — 51701 INSERT BLADDER CATHETER: CPT

## 2020-03-12 PROCEDURE — 99214 OFFICE O/P EST MOD 30 MIN: CPT | Mod: 25

## 2020-03-12 PROCEDURE — 81003 URINALYSIS AUTO W/O SCOPE: CPT | Mod: QW

## 2020-03-12 NOTE — ASSESSMENT
[FreeTextEntry1] : I reviewed her renal MRI findings and sent today's specimen for a repeat culture.  She will continue to use uribel as needed.  We also discussed the use of transvaginal estrogen and Ellura to help with urinary tract infection prevention and she will let me know how she would like to proceed with respect to the estrogen cream.

## 2020-03-12 NOTE — HISTORY OF PRESENT ILLNESS
[FreeTextEntry1] : Patient is a 60 yo female status post-op recent sigmoid resection by Dr. Horacio Najera for a colovesical fistula.  She underwent a repeat CT scan with rectal contrast post op which revealed a persistent small amt of air in the bladder but her bowel anastomosis appears intact.  She also had an indeterminate left renal lesion and I sent her for a MRI to further characterize it.  It is thought to be a 1.1cm left mid hemorrhagic cyst, unchanged, (Bosniak type 2).  \par \par She has persistent urinary urgency which is improving.  She denies dysuria.  She has been intermittently taking uribel.

## 2020-03-12 NOTE — LETTER BODY
[Dear  ___] : Dear  [unfilled], [Consult Letter:] : I had the pleasure of evaluating your patient, [unfilled]. [Please see my note below.] : Please see my note below. [Consult Closing:] : Thank you very much for allowing me to participate in the care of this patient.  If you have any questions, please do not hesitate to contact me. [Sincerely,] : Sincerely, [FreeTextEntry3] : Dr. Yennifer Wang\par

## 2020-03-16 LAB — BACTERIA UR CULT: NORMAL

## 2020-03-25 RX ORDER — ROSUVASTATIN CALCIUM 5 MG/1
1 TABLET ORAL
Qty: 0 | Refills: 0 | DISCHARGE

## 2020-03-25 RX ORDER — LOSARTAN POTASSIUM 100 MG/1
1 TABLET, FILM COATED ORAL
Qty: 0 | Refills: 0 | DISCHARGE

## 2020-11-06 ENCOUNTER — OUTPATIENT (OUTPATIENT)
Dept: OUTPATIENT SERVICES | Facility: HOSPITAL | Age: 60
LOS: 1 days | Discharge: HOME | End: 2020-11-06
Payer: COMMERCIAL

## 2020-11-06 DIAGNOSIS — Z12.31 ENCOUNTER FOR SCREENING MAMMOGRAM FOR MALIGNANT NEOPLASM OF BREAST: ICD-10-CM

## 2020-11-06 DIAGNOSIS — Z90.710 ACQUIRED ABSENCE OF BOTH CERVIX AND UTERUS: Chronic | ICD-10-CM

## 2020-11-06 DIAGNOSIS — Z90.89 ACQUIRED ABSENCE OF OTHER ORGANS: Chronic | ICD-10-CM

## 2020-11-06 PROBLEM — I10 ESSENTIAL (PRIMARY) HYPERTENSION: Chronic | Status: ACTIVE | Noted: 2019-11-22

## 2020-11-06 PROBLEM — N80.9 ENDOMETRIOSIS, UNSPECIFIED: Chronic | Status: ACTIVE | Noted: 2019-11-22

## 2020-11-06 PROBLEM — E78.5 HYPERLIPIDEMIA, UNSPECIFIED: Chronic | Status: ACTIVE | Noted: 2019-11-22

## 2020-11-06 PROBLEM — K57.90 DIVERTICULOSIS OF INTESTINE, PART UNSPECIFIED, WITHOUT PERFORATION OR ABSCESS WITHOUT BLEEDING: Chronic | Status: ACTIVE | Noted: 2019-11-22

## 2020-11-06 PROCEDURE — 77067 SCR MAMMO BI INCL CAD: CPT | Mod: 26

## 2020-11-06 PROCEDURE — 77063 BREAST TOMOSYNTHESIS BI: CPT | Mod: 26

## 2020-12-21 PROBLEM — Z87.440 HISTORY OF URINARY TRACT INFECTION: Status: RESOLVED | Noted: 2019-12-11 | Resolved: 2020-12-21

## 2021-08-12 ENCOUNTER — NON-APPOINTMENT (OUTPATIENT)
Age: 61
End: 2021-08-12

## 2021-08-13 ENCOUNTER — NON-APPOINTMENT (OUTPATIENT)
Age: 61
End: 2021-08-13

## 2021-08-13 ENCOUNTER — APPOINTMENT (OUTPATIENT)
Dept: UROLOGY | Facility: CLINIC | Age: 61
End: 2021-08-13
Payer: COMMERCIAL

## 2021-08-13 VITALS
WEIGHT: 155 LBS | HEART RATE: 56 BPM | BODY MASS INDEX: 24.33 KG/M2 | SYSTOLIC BLOOD PRESSURE: 153 MMHG | TEMPERATURE: 98.2 F | DIASTOLIC BLOOD PRESSURE: 89 MMHG | HEIGHT: 67 IN

## 2021-08-13 PROCEDURE — 99213 OFFICE O/P EST LOW 20 MIN: CPT

## 2021-08-13 PROCEDURE — 81003 URINALYSIS AUTO W/O SCOPE: CPT | Mod: QW

## 2021-08-13 NOTE — HISTORY OF PRESENT ILLNESS
[FreeTextEntry1] : Language: English\par Date of First visit: 2019: normally Dr. Wang's pt\par Accompanied by: Self\par Contact info: [phone]\par Referring Provider/PCP: \par \par ===============================================================================\par FIRST VISIT:\par The patient is a 60 year female who first presented to Dr. Wang 2019\par At the time, she was post-op recent sigmoid resection by Dr. Horacio Najera for a colovesical fistula. She underwent a repeat CT scan with rectal contrast post op which revealed a persistent small amt of air in the bladder but her bowel anastomosis appears intact. She also had an indeterminate left renal lesion and I sent her for a MRI to further characterize it. It is thought to be a 1.1cm left mid hemorrhagic cyst, unchanged, (Bosniak type 2). \par \par -------------------------------------------------------------------------------------------\par INTERVAL VISITS:\par \par She presents today 2021 for symptoms of urinary urgency and pressure. It started around the beginning of 2020 after she had a CT scan. She had the CT for an incidental finding. Her CT scan showed no inflammation. She denies hematuria and fever. her dysuria has improved. She takes Ellura. She has been trying to stay hydrated.\par \par She does not drink coffee and denies constipation. she was started on Zetia around May 2021. \par \par \par ===============================================================================\par \par PMH: HTN, HLD\par PSH: Sigmoid/fistula repair, HUSSEIN\par POBH: (if applicable) , LMP Aug 2011\par FH: HLD\par \par ALL: mild shellfish allergy (clams, mussels oysters) but can eat shrimp lobster crab - she gets ill\par MEDS: Rosuvastatin, Zetia, Losartan, Metoprolol\par SOC: Quit \par \par ROS: Review of Systems is as per HPI unless otherwise denoted below\par \par \par ===============================================================================\par DATA: \par \par LABS:-------------------------------------------------------------------------------------------------------------------\par 2021: UA Trace LE, Trace BLD\par \par \par RADS:-------------------------------------------------------------------------------------------------------------------\par \par \par \par PATHOLOGY/CYTOLOGY:-------------------------------------------------------------------------------------------\par \par \par \par VOIDING STUDIES: ----------------------------------------------------------------------------------------------------\par \par \par \par STONE STUDIES: (Analysis/LLSA)----------------------------------------------------------------------------------\par \par \par \par PROCEDURES: -----------------------------------------------------------------------------------------------\par \par \par \par \par ===============================================================================\par \par \par PHYSICAL EXAM:\par \par GEN: AAOx3, NAD, Habitus: normal\par \par BARRIERS to CARE: none\par \par PSYCH: Appropriate Behavior, Affect Congruent\par \par HEENT: AT/NC Trachea midline. EOMI.\par \par Lungs: No labored breathing.\par \par NEURO: + Movement, all 4 extremities grossly intact without deficits. No tremors.\par \par SKIN: Warm dry. No visible rashes or ulcers\par \par GAIT: Gait normal, Stability good\par \par =======================================================================================\par \par

## 2021-08-13 NOTE — ASSESSMENT
[FreeTextEntry1] : \par =======================================================================================\par ASSESSMENT and PLAN\par \par The patient is a 60 year female with a history of the following:\par \par 1. Urinary urgency with fairly normal UA:\par She has a h/o colovesical fistula s/p repair. Her recent CT in Aug 2021 was normal without bowel issue.\par Her UA dip looks fairly normal.\par We will send a Urine culture and I will give her pyridium.\par The patient understands that Pyridium (phenazopyridine) may turn her urine, tears, mucus and other body fluids orange or dark yellow. It can also stain contact lenses.\par \par \par -----------------------------------------------------------------------------------------------------\par LABS/TESTS Ordered: UCx\par Meds Ordered: Pyridium x3d\par Follow up: with Dr. Wang (phone visit next week)\par -----------------------------------------------------------------------------------------------------\par \par Greater than 50% of this 15  minute visit was spent counseling the patient and coordinating care.\par \par Thank you for allowing me to assist in the care of your patient. Should you have any questions please do not hesitate to reach out to me.\par \par \par Jessica Mcclain MD\par Associate \par Department of Urology\par [facility]\par Phone: [phone]\par Fax: [phone]\par \par [address]\par [facility] [address]\par

## 2021-08-16 LAB — BACTERIA UR CULT: NORMAL

## 2021-08-17 ENCOUNTER — APPOINTMENT (OUTPATIENT)
Dept: UROLOGY | Facility: CLINIC | Age: 61
End: 2021-08-17

## 2021-08-18 ENCOUNTER — APPOINTMENT (OUTPATIENT)
Dept: UROLOGY | Facility: CLINIC | Age: 61
End: 2021-08-18
Payer: COMMERCIAL

## 2021-08-18 ENCOUNTER — APPOINTMENT (OUTPATIENT)
Dept: UROLOGY | Facility: CLINIC | Age: 61
End: 2021-08-18

## 2021-08-18 VITALS — TEMPERATURE: 97.5 F | DIASTOLIC BLOOD PRESSURE: 89 MMHG | HEART RATE: 93 BPM | SYSTOLIC BLOOD PRESSURE: 146 MMHG

## 2021-08-18 PROCEDURE — 52000 CYSTOURETHROSCOPY: CPT

## 2021-08-18 PROCEDURE — 99214 OFFICE O/P EST MOD 30 MIN: CPT | Mod: 25

## 2021-08-18 NOTE — ADDENDUM
[FreeTextEntry1] : A portion of this note was written by [Trevor Burgess] on 08/16/2021 acting as a scribe for Dr. Wang. \par \par I have personally reviewed the chart and agree that the record accurately reflects my personal performance of the history, physical exam, assessment, and plan.

## 2021-08-18 NOTE — ASSESSMENT
[FreeTextEntry1] : 59 yo female with hx of colovescial fistula repair in 2019 with 2 week hx of urinary urgency and "pressure".  Urine was negative for infection. She is feeling somewhat better but presents today for cystoscopy which was negative for evidence of fistula, tumor or stone.  I am repeating her culture and she will continue to hydrate and take pyridium for the time being.   She also complains of vaginal irritation and I obtained a vagina swab.

## 2021-08-18 NOTE — HISTORY OF PRESENT ILLNESS
[FreeTextEntry1] : Patient is a 58 yo female status post-op sigmoid resection by Dr. Horacio Najera in 2019 for a colovesical fistula.  She was well until 2 weeks ago when she underwent a CT scan for an incidental finding.  Shortly afterwards, (2 days), she developed urinary urgency and "pressure", which is now somewhat improved.  She presents today for cystoscopy. \par \par Of note- no bowel inflammation on CT scan 8/2021\par \par Of note, she has an indeterminate left renal lesion thought to be a 1.1cm left mid hemorrhagic cyst, unchanged, (Bosniak type 2).  \par \par

## 2021-08-19 LAB
BILIRUB UR QL STRIP: NORMAL
CLARITY UR: CLEAR
COLLECTION METHOD: NORMAL
GLUCOSE UR-MCNC: NORMAL
HCG UR QL: 0.2 EU/DL
HGB UR QL STRIP.AUTO: NORMAL
KETONES UR-MCNC: NORMAL
LEUKOCYTE ESTERASE UR QL STRIP: NORMAL
NITRITE UR QL STRIP: NORMAL
PH UR STRIP: 6
PROT UR STRIP-MCNC: NORMAL
SP GR UR STRIP: 1.01

## 2021-08-24 LAB
BACTERIA GENITAL AEROBE CULT: ABNORMAL
BACTERIA UR CULT: NORMAL
BILIRUB UR QL STRIP: NORMAL
CLARITY UR: CLEAR
COLLECTION METHOD: NORMAL
GLUCOSE UR-MCNC: NORMAL
HCG UR QL: 0.2 EU/DL
HGB UR QL STRIP.AUTO: NORMAL
KETONES UR-MCNC: NORMAL
LEUKOCYTE ESTERASE UR QL STRIP: NORMAL
NITRITE UR QL STRIP: NORMAL
PH UR STRIP: 6.5
PROT UR STRIP-MCNC: NORMAL
SP GR UR STRIP: 1.02

## 2021-08-25 ENCOUNTER — NON-APPOINTMENT (OUTPATIENT)
Age: 61
End: 2021-08-25

## 2021-09-28 ENCOUNTER — APPOINTMENT (OUTPATIENT)
Dept: SURGICAL ONCOLOGY | Facility: CLINIC | Age: 61
End: 2021-09-28
Payer: COMMERCIAL

## 2021-09-28 VITALS
DIASTOLIC BLOOD PRESSURE: 90 MMHG | WEIGHT: 162 LBS | OXYGEN SATURATION: 95 % | TEMPERATURE: 98 F | SYSTOLIC BLOOD PRESSURE: 127 MMHG | HEART RATE: 84 BPM | BODY MASS INDEX: 25.43 KG/M2 | HEIGHT: 67 IN

## 2021-09-28 DIAGNOSIS — K86.9 DISEASE OF PANCREAS, UNSPECIFIED: ICD-10-CM

## 2021-09-28 DIAGNOSIS — N32.1 VESICOINTESTINAL FISTULA: ICD-10-CM

## 2021-09-28 DIAGNOSIS — Z78.9 OTHER SPECIFIED HEALTH STATUS: ICD-10-CM

## 2021-09-28 DIAGNOSIS — I72.8 ANEURYSM OF OTHER SPECIFIED ARTERIES: ICD-10-CM

## 2021-09-28 PROCEDURE — 99203 OFFICE O/P NEW LOW 30 MIN: CPT

## 2021-09-28 RX ORDER — SULFAMETHOXAZOLE AND TRIMETHOPRIM 800; 160 MG/1; MG/1
800-160 TABLET ORAL
Qty: 10 | Refills: 0 | Status: DISCONTINUED | COMMUNITY
Start: 2020-02-13 | End: 2021-09-28

## 2021-09-28 RX ORDER — LOSARTAN POTASSIUM 50 MG/1
50 TABLET, FILM COATED ORAL
Refills: 0 | Status: ACTIVE | COMMUNITY

## 2021-09-28 RX ORDER — METHENAMINE, SODIUM PHOSPHATE, MONOBASIC, MONOHYDRATE, PHENYL SALICYLATE, METHYLENE BLUE, AND HYOSCYAMINE SULFATE 118; 40.8; 36; 10; .12 MG/1; MG/1; MG/1; MG/1; MG/1
118 CAPSULE ORAL 4 TIMES DAILY
Qty: 16 | Refills: 3 | Status: DISCONTINUED | COMMUNITY
Start: 2020-02-13 | End: 2021-09-28

## 2021-09-28 RX ORDER — ROSUVASTATIN CALCIUM 40 MG/1
40 TABLET, FILM COATED ORAL
Refills: 0 | Status: ACTIVE | COMMUNITY

## 2021-09-28 RX ORDER — EZETIMIBE 10 MG/1
TABLET ORAL
Refills: 0 | Status: ACTIVE | COMMUNITY

## 2021-09-28 RX ORDER — METOPROLOL SUCCINATE 25 MG/1
25 TABLET, EXTENDED RELEASE ORAL
Refills: 0 | Status: ACTIVE | COMMUNITY

## 2021-09-28 RX ORDER — PHENAZOPYRIDINE 200 MG/1
200 TABLET, FILM COATED ORAL 3 TIMES DAILY
Qty: 10 | Refills: 0 | Status: DISCONTINUED | COMMUNITY
Start: 2021-08-13 | End: 2021-09-28

## 2021-09-28 RX ORDER — PHENAZOPYRIDINE HYDROCHLORIDE 200 MG/1
200 TABLET ORAL 3 TIMES DAILY
Qty: 9 | Refills: 0 | Status: DISCONTINUED | COMMUNITY
Start: 2021-08-18 | End: 2021-09-28

## 2021-10-04 LAB — CANCER AG19-9 SERPL-ACNC: 9 U/ML

## 2021-10-06 NOTE — ASSESSMENT
[FreeTextEntry1] : I) pancreas lesion\par \par P) Long discussion about findings. Has a small lesion on pancreas "incidentally" found. Unclear if significant or not. Options are repeat imaging in a few months or EUS evaluation to see is identifiable and biopsy. Patient prefers a more aggressive investigation at this time. Will refer for EUS and follow up after that. Will also send Ca 19-9.\par All questions answered.\par \par Wang Hamilton MD\par \par Chief Surgical Oncology\par Multidisciplinary GI cancer program\par [facility] [place]\par [facility]\par \par Professor Surgery\par [employer]\par \par cc Dr Marli Tatum

## 2021-10-06 NOTE — PHYSICAL EXAM
[Normal Neck Lymph Nodes] : normal neck lymph nodes  [Normal Supraclavicular Lymph Nodes] : normal supraclavicular lymph nodes [Normal] : oriented to person, place and time, with appropriate affect [de-identified] : Anicteric [de-identified] : S1,S2, regular rate and rhythm. No murmurs heard. [de-identified] : Clear throughout. No wheezes heard. [de-identified] : warm ,dry

## 2021-10-06 NOTE — RESULTS/DATA
[FreeTextEntry1] : Diagnostic Studies\par \par Date: 9/17/21\par Study: MRI Abdomen WWO ([facility])\par Results: 3 mm rounded hypointense lesion at the junction of the pancreatic head and uncinate process which is only seen postcontrast and is isointense to the pancreatic parenchyma on the T2 weighted imaging, not consistent with a cyst and may represent an early pancreatic adenocarcinoma. Surgical consultation and/or GI consultation for EUS is advised.\par 1.1 cm left anterior interpolar Bosniak 2 cyst. 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 Indeterminate 4 mm cystic pancreatic lesion. Follow up MRI Abdomen without and with intravenous contrast recommended for further evaluation\par Small faint ground glass opacities in the lung bases bilaterally, likely infectious versus inflammatory. follow up post treatment noncontrast CT chest recommended in 4-6 weeks to ensure resolution\par \par Date: 2/11/2020\par Study: CT AP WW ([facility])\par Results: The sigmoid colon is adherent to the urinary bladder in 2 areas. Air is noted in the urinary bladder. However, no contrast extension from the sigmoid was noted within these areas. Fistula is postulated as air is noted in the urinary bladder.\par Hepatic cysts and 3 areas of focal fatty infiltration in the liver\par Angiomyolipoma right kidney\par Complex maximum left kidney which does not measure simple cyst values. It was hyperechoic on sonogram. MR abdomen pre and post strongly recommended to assess this. Renal neoplasm considered.\par Mild amount of fluid in the cul de sac. Possibly postsurgical.\par *Pancreas and adrenals unremarkable*\par \par

## 2021-10-06 NOTE — HISTORY OF PRESENT ILLNESS
[de-identified] : Patient Name: RIYA GRAMAJO \par MRN: [id number] \par [facility] MRN:[id number]\par Referring Provider: none \par Oncologist:\par Date:\par \par Diagnosis:\par Operative Date:\par Procedure:\par Pathology:\par \par 61 year female  presents for evaluation of pancreas lesion. In November 2019 she underwent a sigmoid resection for a colovesical fistula which was d/t her hysterectomy. Dr. Najera performed the sigmoid resection. Part of her pre surgical workup included a CT chest and it showed a possible splenic artery aneurysm. She underwent surgery and had a CT AP in February 2020. Pancreas was reportedly normal. She had another CT in July 2021 which showed two unchanged partially peripherally calcified splenic artery aneurysms, measuring up to 1.5cm. She had an MRI 9/17/21 showing a 3 mm hypointense lesion at the junction of the pancreas head and uncinate process. Also showed 2 splenic artery pseudoaneurysms. She is here for a surgical consultation.\par \par Currently, Ms. GRAMAJO denies abdominal pain and discomfort, denies having decreased appetite, and denies nausea or vomiting. She denies changes in bowel habits and denies recent unintentional weight loss. She denies fevers, chills, or night sweats.\par \par Functional Status: Ms. GRAMAJO is able to exercise without fatigue or dyspnea.\par

## 2021-10-18 ENCOUNTER — LABORATORY RESULT (OUTPATIENT)
Age: 61
End: 2021-10-18

## 2021-10-20 ENCOUNTER — APPOINTMENT (OUTPATIENT)
Age: 61
End: 2021-10-20

## 2021-10-20 ENCOUNTER — OUTPATIENT (OUTPATIENT)
Dept: OUTPATIENT SERVICES | Facility: HOSPITAL | Age: 61
LOS: 1 days | Discharge: HOME | End: 2021-10-20
Payer: COMMERCIAL

## 2021-10-20 DIAGNOSIS — Z90.710 ACQUIRED ABSENCE OF BOTH CERVIX AND UTERUS: Chronic | ICD-10-CM

## 2021-10-20 DIAGNOSIS — Z90.89 ACQUIRED ABSENCE OF OTHER ORGANS: Chronic | ICD-10-CM

## 2021-10-20 PROCEDURE — 43259 EGD US EXAM DUODENUM/JEJUNUM: CPT

## 2021-10-20 PROCEDURE — 43237 ENDOSCOPIC US EXAM ESOPH: CPT

## 2021-10-20 PROCEDURE — 43239 EGD BIOPSY SINGLE/MULTIPLE: CPT

## 2021-11-15 ENCOUNTER — OUTPATIENT (OUTPATIENT)
Dept: OUTPATIENT SERVICES | Facility: HOSPITAL | Age: 61
LOS: 1 days | Discharge: HOME | End: 2021-11-15
Payer: COMMERCIAL

## 2021-11-15 DIAGNOSIS — Z90.710 ACQUIRED ABSENCE OF BOTH CERVIX AND UTERUS: Chronic | ICD-10-CM

## 2021-11-15 DIAGNOSIS — Z12.31 ENCOUNTER FOR SCREENING MAMMOGRAM FOR MALIGNANT NEOPLASM OF BREAST: ICD-10-CM

## 2021-11-15 DIAGNOSIS — Z90.89 ACQUIRED ABSENCE OF OTHER ORGANS: Chronic | ICD-10-CM

## 2021-11-15 PROCEDURE — 77067 SCR MAMMO BI INCL CAD: CPT | Mod: 26

## 2021-11-15 PROCEDURE — 77063 BREAST TOMOSYNTHESIS BI: CPT | Mod: 26

## 2021-12-01 ENCOUNTER — NON-APPOINTMENT (OUTPATIENT)
Age: 61
End: 2021-12-01

## 2021-12-29 ENCOUNTER — NON-APPOINTMENT (OUTPATIENT)
Age: 61
End: 2021-12-29

## 2022-01-06 ENCOUNTER — APPOINTMENT (OUTPATIENT)
Dept: UROLOGY | Facility: CLINIC | Age: 62
End: 2022-01-06
Payer: COMMERCIAL

## 2022-01-06 VITALS
SYSTOLIC BLOOD PRESSURE: 154 MMHG | DIASTOLIC BLOOD PRESSURE: 93 MMHG | TEMPERATURE: 97.6 F | OXYGEN SATURATION: 97 % | HEART RATE: 67 BPM

## 2022-01-06 PROCEDURE — 81003 URINALYSIS AUTO W/O SCOPE: CPT | Mod: QW

## 2022-01-06 PROCEDURE — 99213 OFFICE O/P EST LOW 20 MIN: CPT | Mod: 25

## 2022-01-06 PROCEDURE — 51701 INSERT BLADDER CATHETER: CPT

## 2022-01-06 NOTE — HISTORY OF PRESENT ILLNESS
[FreeTextEntry1] : Patient is a 62 yo female status post-op sigmoid resection by Dr. Horacio Najera in 2019 for a colovesical fistula. She returns today for a straight -cath specimen as her recent urine culture was contaminated. Pt states that she is feeling much better. \par \par Full Hx:\par She was well until 2 weeks ago when she underwent a CT scan for an incidental finding.  Shortly afterwards, (2 days), she developed urinary urgency and "pressure", which is now somewhat improved.  She presents today for cystoscopy. \par \par Of note- no bowel inflammation on CT scan 8/2021\par \par Of note, she has an indeterminate left renal lesion thought to be a 1.1cm left mid hemorrhagic cyst, unchanged, (Bosniak type 2).  \par \par

## 2022-01-06 NOTE — ADDENDUM
[FreeTextEntry1] : A portion of this note was written by [Janet Aguirre] on 01/06/2022 acting as a scribe for Dr. Wang. \par \par I have personally reviewed the chart and agree that the record accurately reflects my personal performance of the history, physical exam, assessment, and plan.

## 2022-01-06 NOTE — ASSESSMENT
[FreeTextEntry1] : 60 yo female with hx of colovescial fistula repair in 2019 with 2 week hx of urinary urgency and "pressure".  Today's straight -cath specimen was sent for culture. Will be in touch with results.

## 2022-01-07 LAB
BILIRUB UR QL STRIP: NORMAL
CLARITY UR: CLEAR
COLLECTION METHOD: NORMAL
GLUCOSE UR-MCNC: NORMAL
HCG UR QL: 0.2 EU/DL
HGB UR QL STRIP.AUTO: NORMAL
KETONES UR-MCNC: NORMAL
LEUKOCYTE ESTERASE UR QL STRIP: NORMAL
NITRITE UR QL STRIP: NORMAL
PH UR STRIP: 7
PROT UR STRIP-MCNC: NORMAL
SP GR UR STRIP: 1.01

## 2022-01-10 ENCOUNTER — NON-APPOINTMENT (OUTPATIENT)
Age: 62
End: 2022-01-10

## 2022-01-10 LAB — BACTERIA UR CULT: NORMAL

## 2022-11-29 ENCOUNTER — OUTPATIENT (OUTPATIENT)
Dept: OUTPATIENT SERVICES | Facility: HOSPITAL | Age: 62
LOS: 1 days | Discharge: HOME | End: 2022-11-29

## 2022-11-29 DIAGNOSIS — Z12.31 ENCOUNTER FOR SCREENING MAMMOGRAM FOR MALIGNANT NEOPLASM OF BREAST: ICD-10-CM

## 2022-11-29 DIAGNOSIS — Z90.89 ACQUIRED ABSENCE OF OTHER ORGANS: Chronic | ICD-10-CM

## 2022-11-29 DIAGNOSIS — Z90.710 ACQUIRED ABSENCE OF BOTH CERVIX AND UTERUS: Chronic | ICD-10-CM

## 2022-11-29 PROCEDURE — 77067 SCR MAMMO BI INCL CAD: CPT | Mod: 26

## 2022-11-29 PROCEDURE — 77063 BREAST TOMOSYNTHESIS BI: CPT | Mod: 26

## 2023-12-02 ENCOUNTER — OUTPATIENT (OUTPATIENT)
Dept: OUTPATIENT SERVICES | Facility: HOSPITAL | Age: 63
LOS: 1 days | End: 2023-12-02
Payer: MEDICARE

## 2023-12-02 DIAGNOSIS — Z12.31 ENCOUNTER FOR SCREENING MAMMOGRAM FOR MALIGNANT NEOPLASM OF BREAST: ICD-10-CM

## 2023-12-02 DIAGNOSIS — Z90.710 ACQUIRED ABSENCE OF BOTH CERVIX AND UTERUS: Chronic | ICD-10-CM

## 2023-12-02 DIAGNOSIS — Z90.89 ACQUIRED ABSENCE OF OTHER ORGANS: Chronic | ICD-10-CM

## 2023-12-02 PROCEDURE — 77067 SCR MAMMO BI INCL CAD: CPT | Mod: 26

## 2023-12-02 PROCEDURE — 77063 BREAST TOMOSYNTHESIS BI: CPT | Mod: 26

## 2023-12-02 PROCEDURE — 77063 BREAST TOMOSYNTHESIS BI: CPT

## 2023-12-02 PROCEDURE — 77067 SCR MAMMO BI INCL CAD: CPT

## 2023-12-03 DIAGNOSIS — Z12.31 ENCOUNTER FOR SCREENING MAMMOGRAM FOR MALIGNANT NEOPLASM OF BREAST: ICD-10-CM

## 2025-02-01 ENCOUNTER — OUTPATIENT (OUTPATIENT)
Dept: OUTPATIENT SERVICES | Facility: HOSPITAL | Age: 65
LOS: 1 days | End: 2025-02-01
Payer: MEDICARE

## 2025-02-01 DIAGNOSIS — Z12.31 ENCOUNTER FOR SCREENING MAMMOGRAM FOR MALIGNANT NEOPLASM OF BREAST: ICD-10-CM

## 2025-02-01 DIAGNOSIS — Z90.710 ACQUIRED ABSENCE OF BOTH CERVIX AND UTERUS: Chronic | ICD-10-CM

## 2025-02-01 DIAGNOSIS — Z90.89 ACQUIRED ABSENCE OF OTHER ORGANS: Chronic | ICD-10-CM

## 2025-02-01 PROCEDURE — 77063 BREAST TOMOSYNTHESIS BI: CPT | Mod: 26

## 2025-02-01 PROCEDURE — 77067 SCR MAMMO BI INCL CAD: CPT

## 2025-02-01 PROCEDURE — 77063 BREAST TOMOSYNTHESIS BI: CPT

## 2025-02-01 PROCEDURE — 77067 SCR MAMMO BI INCL CAD: CPT | Mod: 26

## 2025-02-02 DIAGNOSIS — Z12.31 ENCOUNTER FOR SCREENING MAMMOGRAM FOR MALIGNANT NEOPLASM OF BREAST: ICD-10-CM

## 2025-05-13 ENCOUNTER — APPOINTMENT (OUTPATIENT)
Dept: GASTROENTEROLOGY | Facility: CLINIC | Age: 65
End: 2025-05-13

## 2025-05-13 ENCOUNTER — TRANSCRIPTION ENCOUNTER (OUTPATIENT)
Age: 65
End: 2025-05-13

## 2025-05-13 ENCOUNTER — NON-APPOINTMENT (OUTPATIENT)
Age: 65
End: 2025-05-13

## 2025-05-13 VITALS
TEMPERATURE: 96.8 F | BODY MASS INDEX: 23.54 KG/M2 | HEIGHT: 67 IN | WEIGHT: 150 LBS | OXYGEN SATURATION: 98 % | HEART RATE: 80 BPM | SYSTOLIC BLOOD PRESSURE: 124 MMHG | DIASTOLIC BLOOD PRESSURE: 72 MMHG | RESPIRATION RATE: 14 BRPM

## 2025-05-13 DIAGNOSIS — K86.2 CYST OF PANCREAS: ICD-10-CM

## 2025-05-13 DIAGNOSIS — Z12.11 ENCOUNTER FOR SCREENING FOR MALIGNANT NEOPLASM OF COLON: ICD-10-CM

## 2025-05-13 PROCEDURE — ZZZZZ: CPT

## 2025-05-13 RX ORDER — SODIUM SULFATE, MAGNESIUM SULFATE, AND POTASSIUM CHLORIDE 17.75; 2.7; 2.25 G/1; G/1; G/1
1479-225-188 TABLET ORAL
Qty: 1 | Refills: 0 | Status: ACTIVE | COMMUNITY
Start: 2025-05-13 | End: 1900-01-01

## 2025-05-27 ENCOUNTER — APPOINTMENT (OUTPATIENT)
Dept: GASTROENTEROLOGY | Facility: CLINIC | Age: 65
End: 2025-05-27

## 2025-06-12 ENCOUNTER — APPOINTMENT (OUTPATIENT)
Dept: GASTROENTEROLOGY | Facility: CLINIC | Age: 65
End: 2025-06-12
Payer: MEDICARE

## 2025-06-12 ENCOUNTER — RESULT REVIEW (OUTPATIENT)
Age: 65
End: 2025-06-12

## 2025-06-12 PROCEDURE — 45385 COLONOSCOPY W/LESION REMOVAL: CPT
